# Patient Record
Sex: MALE | Race: WHITE | NOT HISPANIC OR LATINO | URBAN - METROPOLITAN AREA
[De-identification: names, ages, dates, MRNs, and addresses within clinical notes are randomized per-mention and may not be internally consistent; named-entity substitution may affect disease eponyms.]

---

## 2024-08-20 ENCOUNTER — OFFICE VISIT (OUTPATIENT)
Dept: URBAN - METROPOLITAN AREA CLINIC 78 | Facility: CLINIC | Age: 39
End: 2024-08-20
Payer: COMMERCIAL

## 2024-08-20 ENCOUNTER — DASHBOARD ENCOUNTERS (OUTPATIENT)
Age: 39
End: 2024-08-20

## 2024-08-20 VITALS
HEART RATE: 71 BPM | SYSTOLIC BLOOD PRESSURE: 112 MMHG | HEIGHT: 70 IN | WEIGHT: 211 LBS | BODY MASS INDEX: 30.21 KG/M2 | DIASTOLIC BLOOD PRESSURE: 79 MMHG | TEMPERATURE: 97.9 F

## 2024-08-20 DIAGNOSIS — A49.8 INFECTION DUE TO NON-O157 SHIGA TOXIN-PRODUCING ESCHERICHIA COLI (E.COLI): ICD-10-CM

## 2024-08-20 DIAGNOSIS — Z80.0 FAMILY HISTORY OF ESOPHAGEAL CANCER: ICD-10-CM

## 2024-08-20 DIAGNOSIS — R19.4 CHANGE IN BOWEL HABITS: ICD-10-CM

## 2024-08-20 PROBLEM — 328291000119103: Status: ACTIVE | Noted: 2024-08-20

## 2024-08-20 PROBLEM — 88111009: Status: ACTIVE | Noted: 2024-08-20

## 2024-08-20 PROBLEM — 430331003: Status: ACTIVE | Noted: 2024-08-20

## 2024-08-20 PROCEDURE — 99204 OFFICE O/P NEW MOD 45 MIN: CPT

## 2024-08-20 RX ORDER — LOSARTAN POTASSIUM 50 MG/1
TAKE ONE TABLET BY MOUTH ONE TIME DAILY TABLET, FILM COATED ORAL
Qty: 30 UNSPECIFIED | Refills: 2 | Status: ACTIVE | COMMUNITY

## 2024-08-20 RX ORDER — OMEPRAZOLE 20 MG/1
1 CAPSULE 30 MINUTES BEFORE MORNING MEAL CAPSULE, DELAYED RELEASE ORAL ONCE A DAY
Status: ACTIVE | COMMUNITY

## 2024-08-20 RX ORDER — CIPROFLOXACIN HYDROCHLORIDE 500 MG/1
TAKE ONE TABLET BY MOUTH EVERY 12 HOURS TABLET, FILM COATED ORAL
Qty: 10 UNSPECIFIED | Refills: 0 | Status: ACTIVE | COMMUNITY

## 2024-08-20 NOTE — HPI-TODAY'S VISIT:
30-year-old male, new patient, presents for follow-up after being evaluated at bedside on 8/18 and 8/28 for diarrhea, fatigue, and dehydration.  Blood work from 8/20 showed CBC with WNL WBC 8.7, hemoglobin of 15.9, hematocrit of 47.6, platelets at 228.  CMP is unremarkable with sodium at 140, potassium at 4.2, glucose at 93, BUN 9, creatinine at 1, calcium at 10, total protein is 7.9, total bilirubin 0.7, alkaline phosphatase 80, AST 14, ALT at 16.  Patient underwent stool studies which were positive for enteropathic E. coli but were otherwise negative.  He reports that he received a prescription of ciprofloxacin 500 mg twice daily for course of 5 days yesterday.  He started taking this medication last night and took a second dose this morning.  He reports that his symptoms of diarrhea, and fatigue have significantly improved.  He reports that he is feeling better now.  He reports that prior to this he was having 10 loose/liquid bowel movements per day but denies hematochezia or melena.  He reports that now since starting treatment he is having softer stools, and at a lower frequency, 2-3 bowel movements yesterday and 1 so far today.  He denies nausea, vomiting, fevers or chills,  GERD, dysphagia, abdominal pain.  He denies abnormal weight changes.  He is unsure of the cause for this infection as he cannot pinpoint any contaminated food or drinks that he consumed, denies sick contacts, denies recent traveling, and denies recent antibiotic use.  He does report that 2 weeks ago he was treated with steroids for fluid in his ears.   He does report a family history of esophageal cancer in his father who was diagnosed at around age 55.  He denies prior EGD or colonoscopy.  He does take Prilosec over-the-counter every other day for the past 10 years for reflux that occurs due to trigger foods.  He denies chest pain or shortness of breath.  He denies recent cardiology or pulmonology evaluation.  He does report that he takes NSAIDs once every 2 weeks but other than this is fairly rare, and denies use of blood thinners.

## 2024-09-05 ENCOUNTER — TELEPHONE ENCOUNTER (OUTPATIENT)
Dept: URBAN - METROPOLITAN AREA CLINIC 78 | Facility: CLINIC | Age: 39
End: 2024-09-05

## 2024-09-05 ENCOUNTER — OFFICE VISIT (OUTPATIENT)
Dept: URBAN - METROPOLITAN AREA CLINIC 78 | Facility: CLINIC | Age: 39
End: 2024-09-05
Payer: COMMERCIAL

## 2024-09-05 VITALS
RESPIRATION RATE: 14 BRPM | SYSTOLIC BLOOD PRESSURE: 144 MMHG | WEIGHT: 201 LBS | HEIGHT: 70 IN | DIASTOLIC BLOOD PRESSURE: 100 MMHG | HEART RATE: 73 BPM | BODY MASS INDEX: 28.77 KG/M2 | TEMPERATURE: 98.1 F

## 2024-09-05 DIAGNOSIS — R10.84 GENERALIZED ABDOMINAL CRAMPING: ICD-10-CM

## 2024-09-05 DIAGNOSIS — R19.7 ACUTE DIARRHEA: ICD-10-CM

## 2024-09-05 PROCEDURE — 99214 OFFICE O/P EST MOD 30 MIN: CPT

## 2024-09-05 RX ORDER — LOSARTAN POTASSIUM 50 MG/1
TAKE ONE TABLET BY MOUTH ONE TIME DAILY TABLET, FILM COATED ORAL
Qty: 30 UNSPECIFIED | Refills: 2 | Status: ACTIVE | COMMUNITY

## 2024-09-05 RX ORDER — DICYCLOMINE HYDROCHLORIDE 20 MG/1
1 TABLET TABLET ORAL THREE TIMES A DAY
Qty: 90 | OUTPATIENT
Start: 2024-09-05 | End: 2024-10-05

## 2024-09-05 RX ORDER — CIPROFLOXACIN HYDROCHLORIDE 500 MG/1
TAKE ONE TABLET BY MOUTH EVERY 12 HOURS TABLET, FILM COATED ORAL
Qty: 10 UNSPECIFIED | Refills: 0 | Status: ON HOLD | COMMUNITY

## 2024-09-05 RX ORDER — SODIUM, POTASSIUM,MAG SULFATES 17.5-3.13G
AS DIRECTED SOLUTION, RECONSTITUTED, ORAL ORAL
Qty: 1 | Refills: 0 | OUTPATIENT
Start: 2024-09-06 | End: 2024-09-08

## 2024-09-05 RX ORDER — OMEPRAZOLE 20 MG/1
1 CAPSULE 30 MINUTES BEFORE MORNING MEAL CAPSULE, DELAYED RELEASE ORAL ONCE A DAY
Status: ACTIVE | COMMUNITY

## 2024-09-05 NOTE — HPI-TODAY'S VISIT:
38 y.o male, established patient, presents for hospital f/u  Patient visited [facility] on 8/31 and was discharged on 9/1 for evaluation of abdominal pain with diarrhea.  At the time he was experiencing weakness, a sensation of flush, diarrhea, and he reports generalized abdominal pain without fevers or chills.  Blood work at this time showed WBC at 10.7, RBC of 5.14, hemoglobin of 15, hematocrit of 45.1, MCV 87.7, platelets at 254.  CMP showed sodium at 140, potassium 3.5, chloride at 102, glucose at 94, BUN at 11, creatinine at 1, calcium at 10.1, total protein is 7, albumin at 4.2, total protein is 0.7, alkaline phosphatase at 73, AST 14, ALT of 11, lipase at 16, UA was unremarkable.  He underwent stool studies which were negative.  He had a CT of his abdomen and pelvis with contrast on 8/31 which showed area of mild colonic wall thickening which could be due to mild edema/colitis or under distention.  Otherwise no acute abnormalities identified.  It also showed small nonobstructing renal stones.   He presents today for follow-up after hospital visit.  He reports that after his last appointment on 8/20/2024 he finished his course of ciprofloxacin that Saturday, 8/24.  He reports that he felt as though symptoms resolved and was able to have 2-3 formed bowel movements daily until Tuesday 8/27 when he started to experience a loss of appetite.  He reports that Wednesday his symptoms got worse and he began to experience abdominal discomfort which then progressed into a generalized abdominal pain with diarrhea consisting of 6 liquid bowel movements per day with mucus in the stool but no blood.  Outside of the recent antibiotic treatment with ciprofloxacin he denies other antibiotic use, sick contacts, or ingestion of contaminated food or drink.  He does report going to the beach with his family the previous weekend however no one else is having the same symptoms as he is.  He tried to have an appointment with his primary care provider however was unable to get appointment.  He ultimately opted to be evaluated at the hospital and as such went to [facility] on 8/31.  He was discharged with a prescription of ciprofloxacin 500 mg twice daily as well as metronidazole 500 mg for 10-day course.  He started taking this on 9/1 and reports that his symptoms have not significantly improved as they did the last time he was treated with ciprofloxacin.  He reports today stating that he continues to have generalized crampy abdominal pain with a low appetite and diarrhea with nocturnal symptoms but denies episodes of incontinence.  He denies fevers or chills.  He does report some nausea without vomiting and denies GERD like symptoms or dysphagia.  He did start taking an align probiotic once a day for the past 2 days with no improvement in symptoms.

## 2024-09-10 ENCOUNTER — OFFICE VISIT (OUTPATIENT)
Dept: URBAN - METROPOLITAN AREA CLINIC 23 | Facility: CLINIC | Age: 39
End: 2024-09-10

## 2024-09-16 LAB
A/G RATIO: 1.8
ABSOLUTE BASOPHILS: 58
ABSOLUTE EOSINOPHILS: 108
ABSOLUTE LYMPHOCYTES: 2017
ABSOLUTE MONOCYTES: 755
ABSOLUTE NEUTROPHILS: 5362
ALBUMIN: 4.4
ALKALINE PHOSPHATASE: 62
ALT (SGPT): 16
ANCA SCREEN: NEGATIVE
AST (SGOT): 15
BASOPHILS: 0.7
BILIRUBIN, TOTAL: 0.5
BUN/CREATININE RATIO: (no result)
BUN: 8
C-REACTIVE PROTEIN, QUANT: 4
CALCIUM: 9.6
CARBON DIOXIDE, TOTAL: 32
CHLORIDE: 104
CREATININE: 1.08
EGFR: 90
EOSINOPHILS: 1.3
GASCA: 9.1
GLOBULIN, TOTAL: 2.5
GLUCOSE: 98
HEMATOCRIT: 47.6
HEMOGLOBIN: 15.8
LYMPHOCYTES: 24.3
MCH: 29.6
MCHC: 33.2
MCV: 89.3
MONOCYTES: 9.1
MPV: 10.9
MYELOPEROXIDASE ANTIBODY: <1
NEUTROPHILS: 64.6
PLATELET COUNT: 278
POTASSIUM: 4.4
PROTEIN, TOTAL: 6.9
PROTEINASE-3 ANTIBODY: <1
RDW: 12.8
RED BLOOD CELL COUNT: 5.33
SACCHAROMYCES CEREVISIAE AB (ASCA) (IGA): 6.9
SED RATE BY MODIFIED: 6
SODIUM: 142
WHITE BLOOD CELL COUNT: 8.3

## 2024-09-20 LAB
CALPROTECTIN, FECAL: 12
PANCREATIC ELASTASE, FECAL: >500

## 2024-09-23 ENCOUNTER — TELEPHONE ENCOUNTER (OUTPATIENT)
Dept: URBAN - METROPOLITAN AREA CLINIC 78 | Facility: CLINIC | Age: 39
End: 2024-09-23

## 2024-09-26 ENCOUNTER — OFFICE VISIT (OUTPATIENT)
Dept: URBAN - METROPOLITAN AREA SURGERY CENTER 15 | Facility: SURGERY CENTER | Age: 39
End: 2024-09-26

## 2024-09-26 RX ORDER — DICYCLOMINE HYDROCHLORIDE 20 MG/1
1 TABLET TABLET ORAL THREE TIMES A DAY
Qty: 90 | Status: ACTIVE | COMMUNITY
Start: 2024-09-05 | End: 2024-10-05

## 2024-09-26 RX ORDER — OMEPRAZOLE 20 MG/1
1 CAPSULE 30 MINUTES BEFORE MORNING MEAL CAPSULE, DELAYED RELEASE ORAL ONCE A DAY
Status: ACTIVE | COMMUNITY

## 2024-09-26 RX ORDER — CIPROFLOXACIN HYDROCHLORIDE 500 MG/1
TAKE ONE TABLET BY MOUTH EVERY 12 HOURS TABLET, FILM COATED ORAL
Qty: 10 UNSPECIFIED | Refills: 0 | Status: ON HOLD | COMMUNITY

## 2024-09-26 RX ORDER — LOSARTAN POTASSIUM 50 MG/1
TAKE ONE TABLET BY MOUTH ONE TIME DAILY TABLET, FILM COATED ORAL
Qty: 30 UNSPECIFIED | Refills: 2 | Status: ACTIVE | COMMUNITY

## 2024-10-10 ENCOUNTER — OFFICE VISIT (OUTPATIENT)
Dept: URBAN - METROPOLITAN AREA SURGERY CENTER 15 | Facility: SURGERY CENTER | Age: 39
End: 2024-10-10